# Patient Record
Sex: FEMALE | Race: WHITE | NOT HISPANIC OR LATINO | Employment: PART TIME | URBAN - METROPOLITAN AREA
[De-identification: names, ages, dates, MRNs, and addresses within clinical notes are randomized per-mention and may not be internally consistent; named-entity substitution may affect disease eponyms.]

---

## 2023-12-14 VITALS
SYSTOLIC BLOOD PRESSURE: 140 MMHG | WEIGHT: 172 LBS | DIASTOLIC BLOOD PRESSURE: 91 MMHG | TEMPERATURE: 98.7 F | RESPIRATION RATE: 20 BRPM | OXYGEN SATURATION: 100 % | HEART RATE: 66 BPM

## 2023-12-14 PROCEDURE — 250N000009 HC RX 250: Performed by: PHYSICIAN ASSISTANT

## 2023-12-14 PROCEDURE — 271N000002 HC RX 271: Performed by: PHYSICIAN ASSISTANT

## 2023-12-14 PROCEDURE — 99283 EMERGENCY DEPT VISIT LOW MDM: CPT

## 2023-12-14 RX ORDER — METHYLCELLULOSE 4000CPS 30 %
POWDER (GRAM) MISCELLANEOUS ONCE
Status: COMPLETED | OUTPATIENT
Start: 2023-12-15 | End: 2023-12-14

## 2023-12-14 RX ADMIN — Medication 3 ML: at 23:39

## 2023-12-14 RX ADMIN — METHYLCELLULOSE: 2 POWDER, FOR SOLUTION ORAL at 23:43

## 2023-12-15 ENCOUNTER — HOSPITAL ENCOUNTER (EMERGENCY)
Facility: HOSPITAL | Age: 23
Discharge: HOME | End: 2023-12-15
Admitting: PHYSICIAN ASSISTANT
Payer: COMMERCIAL

## 2023-12-15 DIAGNOSIS — S01.531A PUNCTURE WOUND OF LIP WITHOUT FOREIGN BODY, INITIAL ENCOUNTER: ICD-10-CM

## 2023-12-15 NOTE — ED PROVIDER NOTES
Emergency Department Encounter   NAME: Anusha Champion ; AGE: 23 year old female ; YOB: 2000 ; MRN: [id number] ; PCP: Mary Zapata   ED PROVIDER: Emily Mota PA-C    Evaluation Date & Time:   No admission date for patient encounter.    CHIEF COMPLAINT:  Lip Laceration      Impression and Plan   MDM:   Anusha Champion is a 23 year old female with no relevant PMH, who presents to the ED by private vehicle for evaluation of lip injury. The patient presented to the emergency department after accidentally biting her left lower lip at home this evening.  She has been applying pressure to the wound though it continues to bleed, prompting her visit to the ER.  She has a 0.5 cm avulsion injury to the left lip that is actively slowly oozing.  No brisk or pulsatile bleeding concerning for arterial injury.  Surrounding sensation and circulation intact.  Given avulsion type injury, repair with sutures would not provide good cosmetic outcome.  Primary concern today is bleeding cessation and topical let with a Tegaderm was applied and sat on the wound for 20 minutes.  After removing this, there was no return of bleeding.  Monitored her in the ED, she is speaking and moving her mouth with no return of bleeding.  At this time, feel she is appropriate for discharge to home.  We discussed rinsing the wound out after meals, monitor for signs of infection and rebleeding, concerning signs and symptoms to return to the ER.  Patient verbalized understanding was discharged home in stable condition.      *Patient examination and workup was initiated in triage due to inpatient hospital and Emergency Department bed shortage resulting in long waiting room wait times. Patient and/or guardian's consent was obtained.     Medical Decision Making    History:  Supplemental history from: Family Member/Significant Other  External Record(s) reviewed: Documented in chart, if applicable.    Work Up:  Chart documentation includes  differential considered and any EKGs or imaging independently interpreted by provider, where specified.  In additional to work up documented, I considered the following work up: Documented in chart, if applicable.    External consultation:  Discussion of management with another provider: Documented in chart, if applicable    Complicating factors:  Care impacted by chronic illness: N/A  Care affected by social determinants of health: N/A    Disposition considerations: Discharge. No recommendations on prescription strength medication(s). See documentation for any additional details.      ED COURSE:  12:00 AM I met and introduced myself to the patient. I gathered initial history and performed my physical exam. We discussed plan for initial workup.   12:35 AM I rechecked the patient and discussed results, discharge, follow up, and reasons to return to the ED.     At the conclusion of the encounter I discussed the results of all the tests and the disposition. The questions were answered. The patient or family acknowledged understanding and was agreeable with the care plan.    FINAL IMPRESSION:    ICD-10-CM    1. Puncture wound of lip without foreign body, initial encounter  S01.531A             MEDICATIONS GIVEN IN THE EMERGENCY DEPARTMENT:  Medications   lido-EPINEPHrine-tetracaine (LET) topical gel GEL (3 mLs Topical $Given 12/14/23 9661)   methylcellulose powder ( Topical $Given 12/14/23 7033)         NEW PRESCRIPTIONS STARTED AT TODAY'S ED VISIT:  There are no discharge medications for this patient.        HPI   Patient information was obtained from: Patient and mother.    Use of Intrepreter: N/A    Anusha Champion is a 23 year old female with no relevant PMH, who presents to the ED by private vehicle for evaluation of lip injury.    Per patient, she accidentally bit her left lip hard this afternoon.  She has been applying pressure to the wound though it continues to ooze.  She attempted to lay down to sleep tonight,  however wound continued to bleed.  She discussed with her mother who advised that she come to the ER for evaluation.  She is not anticoagulated.  Her tetanus is up-to-date.      REVIEW OF SYSTEMS:  Pertinent positive and negative symptoms per HPI.       Medical History     No past medical history on file.    No past surgical history on file.    No family history on file.         No current outpatient medications on file.        Physical Exam     First Vitals:  Patient Vitals for the past 24 hrs:   BP Temp Temp src Pulse Resp SpO2 Weight   12/14/23 2317 (!) 140/91 98.7  F (37.1  C) Tympanic 66 20 100 % 78 kg (172 lb)         PHYSICAL EXAM:   Physical Exam  Vitals and nursing note reviewed.   Constitutional:       General: She is not in acute distress.     Appearance: She is not toxic-appearing.   HENT:      Mouth/Throat:      Mouth: Mucous membranes are moist.      Comments: 0.5 cm avulsion wound to the left inner lip.  Slow active oozing.  No brisk or arterial bleeding.  Surrounding cap refill and sensation intact.  No foreign body in the wound.  Neurological:      Mental Status: She is alert.             Results     LAB:  All pertinent labs reviewed and interpreted  Labs Ordered and Resulted from Time of ED Arrival to Time of ED Departure - No data to display    RADIOLOGY:  No orders to display         Emily Mota PA-C   Emergency Medicine   [facility] EMERGENCY DEPARTMENT       Emily Mota PA-C  12/15/23 0204

## 2023-12-15 NOTE — ED NOTES
Per pt her BP is always high the first time when it is checked. 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

## 2023-12-15 NOTE — ED TRIAGE NOTES
Pt bit her left lower lip. Bleeding on and off. Pt applying pressure on the wound.     Triage Assessment (Adult)       Row Name 12/14/23 9699          Triage Assessment    Airway WDL WDL        Respiratory WDL    Respiratory WDL WDL        Cardiac WDL    Cardiac WDL WDL        Peripheral/Neurovascular WDL    Peripheral Neurovascular WDL WDL        Cognitive/Neuro/Behavioral WDL    Cognitive/Neuro/Behavioral WDL WDL

## 2023-12-15 NOTE — DISCHARGE INSTRUCTIONS
As we discussed, rinse the wound out after eating with water.  Watch for signs of infection including increased pain, redness, swelling, puslike discharge and return if this develops or if you have return of bleeding that you are unable to stop with direct pressure for 20 minutes.    Your blood pressure was elevated in the emergencydepartment today and requires recheck and close follow-up in your primary care clinic. Untreated blood pressure can cause serious complications including, but not limited to stroke, heart attack/failure, and kidney disease.  Please make a close follow-up appointment to have this recheck performed. Please return to the emergency department immediately if you develop a severe headache, vision changes, chest pain, shortness of breath, orabdominal pain.